# Patient Record
Sex: MALE | Race: BLACK OR AFRICAN AMERICAN | NOT HISPANIC OR LATINO | Employment: UNEMPLOYED | URBAN - METROPOLITAN AREA
[De-identification: names, ages, dates, MRNs, and addresses within clinical notes are randomized per-mention and may not be internally consistent; named-entity substitution may affect disease eponyms.]

---

## 2023-08-15 ENCOUNTER — OFFICE VISIT (OUTPATIENT)
Dept: INTERNAL MEDICINE CLINIC | Facility: CLINIC | Age: 23
End: 2023-08-15
Payer: COMMERCIAL

## 2023-08-15 VITALS
BODY MASS INDEX: 27.8 KG/M2 | WEIGHT: 173 LBS | HEART RATE: 95 BPM | TEMPERATURE: 97.5 F | HEIGHT: 66 IN | DIASTOLIC BLOOD PRESSURE: 80 MMHG | SYSTOLIC BLOOD PRESSURE: 130 MMHG | OXYGEN SATURATION: 98 %

## 2023-08-15 DIAGNOSIS — Z00.00 ANNUAL PHYSICAL EXAM: Primary | ICD-10-CM

## 2023-08-15 DIAGNOSIS — Z11.4 SCREENING FOR HIV (HUMAN IMMUNODEFICIENCY VIRUS): ICD-10-CM

## 2023-08-15 DIAGNOSIS — M25.531 ACUTE PAIN OF RIGHT WRIST: ICD-10-CM

## 2023-08-15 DIAGNOSIS — Z11.59 NEED FOR HEPATITIS C SCREENING TEST: ICD-10-CM

## 2023-08-15 DIAGNOSIS — B35.0 TINEA BARBAE: ICD-10-CM

## 2023-08-15 PROCEDURE — 99204 OFFICE O/P NEW MOD 45 MIN: CPT | Performed by: STUDENT IN AN ORGANIZED HEALTH CARE EDUCATION/TRAINING PROGRAM

## 2023-08-15 NOTE — PROGRESS NOTES
INTERNAL MEDICINE OFFICE VISIT NOTE  [facility] Internal Medicine [employer]    NAME: Sherly Villegas  AGE: 26 y.o. SEX: male    DATE OF ENCOUNTER: 8/15/2023       Chief Complaint   Patient presents with   • Establish Care     New Patient to [facility]   Right Wrist Pain fell 1 month ago   Groin Pain     Previously following with a physician within the [employer] [facility]. Last visit - a few months ago. 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 NEUROLOGIC:  Alert/oriented x3. HEENT:  NC/AT, no scleral icterus  CARDIAC:  RRR, +S1/S2  PULMONARY:  non-labored breathing      ASSESSMENT/PLAN:    1. Annual physical exam  -     CBC; Future  -     Comprehensive metabolic panel; Future  -     TSH, 3rd generation with Free T4 reflex; Future  -     Hemoglobin A1C; Future  -     Lipid Panel With Direct LDL; Future    2. Acute pain of right wrist  Assessment & Plan:  Waxing and waning mild pain after he had a fall playing basketball. Reports no loss of range of motion or strength. Been using ibuprofen or Tylenol intermittently with good response  -No tender points on exam or limited range of motion or loss of strength  -I suspect strain of his wrist versus a stress reaction.   Advised continue using ibuprofen as needed  -Follow-up as needed      3. Tinea barbae  Assessment & Plan:  Reported long history of irritation of his skin after shaving with previous evaluation within the [employer] medical service   - Discussed the nature of the condition and treatment. Advised to avoid completely shaving his beard. 4. Need for hepatitis C screening test  -     Hepatitis C Antibody; Future    5. Screening for HIV (human immunodeficiency virus)  -     HIV 1/2 AG/AB w Reflex [facility] for 2 yr old and above; Future      No current outpatient medications on file. TCM Call     None      TCM Call     None             Tip Cid MD  [facility] Internal [facility]    * Please Note: This note was completed in part utilizing a dictation software. Grammatical errors, random word insertions, spelling mistakes, and incomplete sentences may be an occasional consequence of this system secondary to software limitations, ambient noise, and hardware issues. If you have any questions or concerns about the content, text, or information contained within the body of this dictation, please contact the provider for clarification. **      BMI Counseling: Body mass index is 27.92 kg/m². The BMI is above normal. Nutrition recommendations include reducing portion sizes, decreasing overall calorie intake, 3-5 servings of fruits/vegetables daily, reducing fast food intake, consuming healthier snacks and decreasing soda and/or juice intake. Exercise recommendations include moderate aerobic physical activity for 150 minutes/week.

## 2023-08-15 NOTE — ASSESSMENT & PLAN NOTE
Reported long history of irritation of his skin after shaving with previous evaluation within the [employer] medical [employer]   - Discussed the nature of the condition and treatment. Advised to avoid completely shaving his beard.

## 2023-08-15 NOTE — ASSESSMENT & PLAN NOTE
Waxing and waning mild pain after he had a fall playing basketball. Reports no loss of range of motion or strength. Been using ibuprofen or Tylenol intermittently with good response  -No tender points on exam or limited range of motion or loss of strength  -I suspect strain of his wrist versus a stress reaction.   Advised continue using ibuprofen as needed  -Follow-up as needed

## 2024-03-14 ENCOUNTER — TELEPHONE (OUTPATIENT)
Dept: INTERNAL MEDICINE CLINIC | Facility: CLINIC | Age: 24
End: 2024-03-14

## 2024-06-20 NOTE — TELEPHONE ENCOUNTER
Called spoke with patient advised he needs to schedule an appointment with Dr. Bustillos for a Physical and then forms can be filled out. I offered to schedule the appointment and patient stated he will call back to schedule.    ----- Message from Mala Valencia sent at 3/13/2024  2:20 PM EDT -----  Regarding: FW: Physical form   Contact: [phone]    ----- Message -----  From: Tutu Conner  Sent: 3/13/2024   2:19 PM EDT  To: Primary Care [facility] Pod Clinical  Subject: Physical form                                    Afternoon Doctor, hope everything is well including the family. I’m reaching out because I need this form fill out to proceed with a job application.     Please let me know when you go through it and thank you for your time hope you have a good day.      Applied